# Patient Record
Sex: FEMALE | Race: WHITE | Employment: PART TIME | URBAN - METROPOLITAN AREA
[De-identification: names, ages, dates, MRNs, and addresses within clinical notes are randomized per-mention and may not be internally consistent; named-entity substitution may affect disease eponyms.]

---

## 2018-12-13 ENCOUNTER — APPOINTMENT (OUTPATIENT)
Dept: GENERAL RADIOLOGY | Age: 33
End: 2018-12-13
Payer: COMMERCIAL

## 2018-12-13 ENCOUNTER — HOSPITAL ENCOUNTER (EMERGENCY)
Age: 33
Discharge: HOME | End: 2018-12-13
Attending: EMERGENCY MEDICINE
Payer: COMMERCIAL

## 2018-12-13 VITALS
SYSTOLIC BLOOD PRESSURE: 131 MMHG | DIASTOLIC BLOOD PRESSURE: 85 MMHG | BODY MASS INDEX: 37.95 KG/M2 | TEMPERATURE: 99.1 F | WEIGHT: 201 LBS | HEIGHT: 61 IN | HEART RATE: 89 BPM | RESPIRATION RATE: 14 BRPM | OXYGEN SATURATION: 100 %

## 2018-12-13 PROCEDURE — 73610 X-RAY EXAM OF ANKLE: CPT

## 2018-12-13 PROCEDURE — 6370000000 HC RX 637 (ALT 250 FOR IP): Performed by: EMERGENCY MEDICINE

## 2018-12-13 PROCEDURE — 99283 EMERGENCY DEPT VISIT LOW MDM: CPT

## 2018-12-13 RX ORDER — NAPROXEN 375 MG/1
375 TABLET ORAL
Qty: 30 TABLET | Refills: 0 | Status: SHIPPED | OUTPATIENT
Start: 2018-12-13

## 2018-12-13 RX ADMIN — IBUPROFEN 600 MG: 200 TABLET, FILM COATED ORAL at 09:15

## 2018-12-13 ASSESSMENT — PAIN SCALES - GENERAL
PAINLEVEL_OUTOF10: 8
PAINLEVEL_OUTOF10: 8

## 2018-12-13 ASSESSMENT — PAIN DESCRIPTION - PAIN TYPE: TYPE: ACUTE PAIN

## 2018-12-13 ASSESSMENT — PAIN DESCRIPTION - LOCATION: LOCATION: ANKLE

## 2018-12-13 ASSESSMENT — PAIN DESCRIPTION - ORIENTATION: ORIENTATION: RIGHT

## 2018-12-13 ASSESSMENT — PAIN DESCRIPTION - FREQUENCY: FREQUENCY: CONTINUOUS

## 2018-12-13 ASSESSMENT — PAIN DESCRIPTION - DESCRIPTORS: DESCRIPTORS: CONSTANT

## 2018-12-13 NOTE — ED NOTES
Patient verbalized understanding of d/c instructions. Patient given scripts x 1. Patient left the ED and instructed on follow up.         Dominic Scott RN  12/13/18 0176

## 2018-12-21 ENCOUNTER — OFFICE VISIT (OUTPATIENT)
Dept: ORTHOPEDIC SURGERY | Age: 33
End: 2018-12-21
Payer: COMMERCIAL

## 2018-12-21 VITALS
WEIGHT: 201 LBS | HEART RATE: 83 BPM | HEIGHT: 61 IN | BODY MASS INDEX: 37.95 KG/M2 | SYSTOLIC BLOOD PRESSURE: 120 MMHG | DIASTOLIC BLOOD PRESSURE: 86 MMHG

## 2018-12-21 DIAGNOSIS — S93.401A INVERSION SPRAIN OF RIGHT ANKLE, INITIAL ENCOUNTER: Primary | ICD-10-CM

## 2018-12-21 PROCEDURE — 99202 OFFICE O/P NEW SF 15 MIN: CPT | Performed by: ORTHOPAEDIC SURGERY

## 2018-12-21 PROCEDURE — 1036F TOBACCO NON-USER: CPT | Performed by: ORTHOPAEDIC SURGERY

## 2018-12-21 PROCEDURE — G8484 FLU IMMUNIZE NO ADMIN: HCPCS | Performed by: ORTHOPAEDIC SURGERY

## 2018-12-21 PROCEDURE — G8427 DOCREV CUR MEDS BY ELIG CLIN: HCPCS | Performed by: ORTHOPAEDIC SURGERY

## 2018-12-21 PROCEDURE — G8417 CALC BMI ABV UP PARAM F/U: HCPCS | Performed by: ORTHOPAEDIC SURGERY

## 2018-12-21 RX ORDER — MONTELUKAST SODIUM 10 MG/1
10 TABLET ORAL NIGHTLY
COMMUNITY

## 2018-12-21 NOTE — PROGRESS NOTES
Is a 29-year-old female who is a new patient to me. She was in [facility] and turned her ankle. She says she's had these episodes and the passes never had any formal treatment for this. She says she she was given a walking boot but she came comes in because she is a little more swollen normal more black and blue than she was with her previous episodes. ROS: Pertinent items are noted in HPI. No notes on file    Past Medical History:  No date: Asthma     Past Surgical History:  No date:  SECTION    History reviewed. No pertinent family history. Social History    Marital status: Single              Spouse name:                       Years of education:                 Number of children:               Social History Main Topics    Smoking status: Never Smoker                                                                Smokeless tobacco: Never Used                        Alcohol use: Yes                Comment: socially    Drug use: No                Current Outpatient Prescriptions:  montelukast (SINGULAIR) 10 MG tablet, Take 10 mg by mouth nightly, Disp: , Rfl:   Cetirizine HCl (ZYRTEC ALLERGY) 10 MG CAPS, Take by mouth, Disp: , Rfl:   naproxen (NAPROSYN) 375 MG tablet, Take 1 tablet by mouth 3 times daily (with meals), Disp: 30 tablet, Rfl: 0  fluticasone-salmeterol (ADVAIR) 500-50 MCG/DOSE diskus inhaler, Inhale 1 puff into the lungs every 12 hours. , Disp: , Rfl:   Albuterol Sulfate (VENTOLIN HFA IN), Inhale 2 puffs into the lungs as needed. , Disp: , Rfl:   melatonin 3 MG TABS tablet, Take 3 mg by mouth nightly., Disp: , Rfl:     No current facility-administered medications for this visit.        No Known Allergies    VITAL SIGNS:  /86   Pulse 83   Ht 5' 1\" (1.549 m)   Wt 201 lb (91.2 kg)   LMP 11/15/2018   BMI 37.98 kg/m²   On examination it is just a little minor ecchymosis along the lateral peroneal sheath just proximal of the base of the fifth metatarsal.  Her Achilles tendon is intact and she has normal active dorsiflexion and plantar flexion. She has a mild amount of swelling in this is mostly over the fibula. She is tender at the ATFL little bit tender to CFL. She has mild tenderness along the deltoid ligament. She has no anterior tib-fib ligament tenderness. She has normal sensibility dorsum of the foot. She has negative drawer both neutral and plantar flexion. She has some weakness of eversion but this may be secondary to discomfort. Outside films reviewed. This shows no fractures. No syndesmotic widening. His no interosseous lesions. Impression right ankle inversion sprain. Plan: We gave her an elastic wrap and wrapped her ankle for her. We will get her physical therapy. She can use her walking boot at this time. We'll see her back in 3 or 4 weeks.